# Patient Record
Sex: FEMALE | Race: WHITE | NOT HISPANIC OR LATINO | Employment: UNEMPLOYED | URBAN - NONMETROPOLITAN AREA
[De-identification: names, ages, dates, MRNs, and addresses within clinical notes are randomized per-mention and may not be internally consistent; named-entity substitution may affect disease eponyms.]

---

## 2018-08-30 ENCOUNTER — HISTORICAL (OUTPATIENT)
Dept: ADMINISTRATIVE | Facility: HOSPITAL | Age: 33
End: 2018-08-30

## 2019-10-02 ENCOUNTER — HISTORICAL (OUTPATIENT)
Dept: ADMINISTRATIVE | Facility: HOSPITAL | Age: 34
End: 2019-10-02

## 2020-06-30 ENCOUNTER — HISTORICAL (OUTPATIENT)
Dept: ADMINISTRATIVE | Facility: HOSPITAL | Age: 35
End: 2020-06-30

## 2020-09-28 ENCOUNTER — HISTORICAL (OUTPATIENT)
Dept: ADMINISTRATIVE | Facility: HOSPITAL | Age: 35
End: 2020-09-28

## 2020-11-04 ENCOUNTER — HISTORICAL (OUTPATIENT)
Dept: ADMINISTRATIVE | Facility: HOSPITAL | Age: 35
End: 2020-11-04

## 2021-05-27 LAB
CHOLEST SERPL-MCNC: 219 MG/DL (ref 0–200)
HDLC SERPL-MCNC: 47 MG/DL (ref 40–60)
LDLC SERPL CALC-MCNC: 133.2 MG/DL (ref 30–100)
TRIGL SERPL-MCNC: 154 MG/DL (ref 30–200)
TSH SERPL-ACNC: 3.04 UIU/ML (ref 0.36–3.74)

## 2021-09-03 LAB
ALBUMIN SERPL-MCNC: 4.3 G/DL (ref 3.4–5)
ALBUMIN/GLOB SERPL: 1.3 {RATIO}
ALP SERPL-CCNC: 51 U/L (ref 50–144)
ALT SERPL-CCNC: 88 U/L (ref 1–45)
ANION GAP SERPL CALC-SCNC: 8 MMOL/L (ref 7–16)
AST SERPL-CCNC: 68 U/L (ref 14–36)
BASOPHILS # BLD AUTO: 0.03 X10(3)/MCL (ref 0.01–0.08)
BASOPHILS NFR BLD AUTO: 0.5 % (ref 0.1–1.2)
BILIRUB SERPL-MCNC: 0.44 MG/DL (ref 0.1–1)
BUN SERPL-MCNC: 18 MG/DL (ref 7–20)
CALCIUM SERPL-MCNC: 10.1 MG/DL (ref 8.4–10.2)
CHLORIDE SERPL-SCNC: 102 MMOL/L (ref 94–110)
CO2 SERPL-SCNC: 28 MMOL/L (ref 21–32)
CREAT SERPL-MCNC: 1.2 MG/DL (ref 0.52–1.04)
CREAT/UREA NIT SERPL: 15 (ref 12–20)
EOSINOPHIL # BLD AUTO: 0.22 X10(3)/MCL (ref 0.04–0.36)
EOSINOPHIL NFR BLD AUTO: 3.9 % (ref 0.7–7)
ERYTHROCYTE [DISTWIDTH] IN BLOOD BY AUTOMATED COUNT: 13.7 % (ref 11–14.5)
GLOBULIN SER-MCNC: 3.3 G/DL (ref 2–3.9)
GLUCOSE SERPL-MCNC: 103 MGM./DL (ref 70–115)
HCT VFR BLD AUTO: 39.9 % (ref 36–48)
HGB BLD-MCNC: 13 G/DL (ref 11.8–16)
IMM GRANULOCYTES # BLD AUTO: 0.02 X10E3/UL (ref 0–0.03)
IMM GRANULOCYTES NFR BLD AUTO: 0.4 % (ref 0–0.5)
LYMPHOCYTES # BLD AUTO: 2.07 X10(3)/MCL (ref 1.16–3.74)
LYMPHOCYTES NFR BLD AUTO: 36.5 % (ref 20–55)
MCH RBC QN AUTO: 29.8 PG (ref 27–34)
MCHC RBC AUTO-ENTMCNC: 32.6 G/DL (ref 31–37)
MCV RBC AUTO: 91.5 FL (ref 79–99)
MONOCYTES # BLD AUTO: 0.34 X10(3)/MCL (ref 0.24–0.36)
MONOCYTES NFR BLD AUTO: 6 % (ref 4.7–12.5)
NEUTROPHILS # BLD AUTO: 2.99 X10(3)/MCL (ref 1.56–6.13)
NEUTROPHILS NFR BLD AUTO: 52.7 % (ref 37–73)
PLATELET # BLD AUTO: 280 X10(3)/MCL (ref 140–371)
PMV BLD AUTO: 12.2 FL (ref 9.4–12.4)
POTASSIUM SERPL-SCNC: 4.4 MMOL/L (ref 3.5–5.1)
PROT SERPL-MCNC: 7.6 G/DL (ref 6.3–8.2)
RBC # BLD AUTO: 4.36 X10(6)/MCL (ref 4–5.1)
SODIUM SERPL-SCNC: 138 MMOL/L (ref 135–145)
WBC # SPEC AUTO: 5.7 X10(3)/MCL (ref 4–11.5)

## 2021-09-29 LAB
ALBUMIN SERPL-MCNC: 3.7 G/DL (ref 3.4–5)
ALBUMIN/GLOB SERPL: 1.2 {RATIO}
ALP SERPL-CCNC: 41 U/L (ref 50–144)
ALT SERPL-CCNC: 69 U/L (ref 1–45)
AST SERPL-CCNC: 56 U/L (ref 14–36)
BILIRUB SERPL-MCNC: 0.35 MG/DL (ref 0.1–1)
BILIRUBIN DIRECT+TOT PNL SERPL-MCNC: 0 MG/DL (ref 0–0.3)
BILIRUBIN DIRECT+TOT PNL SERPL-MCNC: 0.14 MG/DL (ref 0–1.1)
GLOBULIN SER-MCNC: 3 G/DL (ref 2–3.9)
PROT SERPL-MCNC: 6.7 G/DL (ref 6.3–8.2)

## 2021-10-06 ENCOUNTER — HISTORICAL (OUTPATIENT)
Dept: ADMINISTRATIVE | Facility: HOSPITAL | Age: 36
End: 2021-10-06

## 2021-12-27 LAB
ALBUMIN SERPL-MCNC: 3.7 G/DL (ref 3.4–5)
ALBUMIN/GLOB SERPL: 1.4 {RATIO}
ALP SERPL-CCNC: 43 U/L (ref 50–144)
ALT SERPL-CCNC: 57 U/L (ref 1–45)
ANION GAP SERPL CALC-SCNC: 11 MMOL/L (ref 2–13)
AST SERPL-CCNC: 50 U/L (ref 14–36)
BASOPHILS # BLD AUTO: 0.03 10*3/UL (ref 0.01–0.08)
BASOPHILS NFR BLD AUTO: 0.5 % (ref 0.1–1.2)
BILIRUB SERPL-MCNC: 0.55 MG/DL (ref 0–1)
BUN SERPL-MCNC: 11 MG/DL (ref 7–20)
CALCIUM SERPL-MCNC: 9.5 MG/DL (ref 8.4–10.2)
CHLORIDE SERPL-SCNC: 107 MMOL/L (ref 94–110)
CHOLEST SERPL-MCNC: 213 MG/DL (ref 0–200)
CO2 SERPL-SCNC: 21 MMOL/L (ref 21–32)
CREAT SERPL-MCNC: 1.02 MG/DL (ref 0.52–1.04)
CREAT/UREA NIT SERPL: 10.8 (ref 12–20)
EOSINOPHIL # BLD AUTO: 0.65 10*3/UL (ref 0.04–0.36)
EOSINOPHIL NFR BLD AUTO: 10.5 % (ref 0.7–7)
ERYTHROCYTE [DISTWIDTH] IN BLOOD BY AUTOMATED COUNT: 12.1 % (ref 11–14.5)
GLOBULIN SER-MCNC: 2.7 G/DL (ref 2–3.9)
GLUCOSE SERPL-MCNC: 88 MGM./DL (ref 70–115)
HCT VFR BLD AUTO: 37.2 % (ref 36–48)
HDLC SERPL-MCNC: 42 MG/DL (ref 40–60)
HGB BLD-MCNC: 12.3 G/DL (ref 11.8–16)
IMM GRANULOCYTES # BLD AUTO: 0.01 10*3/UL (ref 0–0.03)
IMM GRANULOCYTES NFR BLD AUTO: 0.2 % (ref 0–0.5)
LDLC SERPL CALC-MCNC: 127.7 MG/DL (ref 30–100)
LYMPHOCYTES # BLD AUTO: 2.78 10*3/UL (ref 1.16–3.74)
LYMPHOCYTES NFR BLD AUTO: 44.9 % (ref 20–55)
MCH RBC QN AUTO: 31.2 PG (ref 27–34)
MCHC RBC AUTO-ENTMCNC: 33.1 G/DL (ref 31–37)
MCV RBC AUTO: 94.4 FL (ref 79–99)
MONOCYTES # BLD AUTO: 0.29 10*3/UL (ref 0.24–0.36)
MONOCYTES NFR BLD AUTO: 4.7 % (ref 4.7–12.5)
NEUTROPHILS # BLD AUTO: 2.43 10*3/UL (ref 1.56–6.13)
NEUTROPHILS NFR BLD AUTO: 39.2 % (ref 37–73)
PLATELET # BLD AUTO: 290 10*3/UL (ref 140–371)
PMV BLD AUTO: 12.4 FL (ref 9.4–12.4)
POTASSIUM SERPL-SCNC: 4.4 MMOL/L (ref 3.5–5.1)
PROT SERPL-MCNC: 6.4 G/DL (ref 6.3–8.2)
RBC # BLD AUTO: 3.94 10*6/UL (ref 4–5.1)
SODIUM SERPL-SCNC: 139 MMOL/L (ref 135–145)
TRIGL SERPL-MCNC: 187 MG/DL (ref 30–200)
WBC # SPEC AUTO: 6.2 10*3/UL (ref 4–11.5)

## 2022-01-31 LAB
AGE: 34
ALBUMIN SERPL-MCNC: 4.3 G/DL (ref 3.4–5)
ALBUMIN/GLOB SERPL: 1.3 {RATIO}
ALP SERPL-CCNC: 41 U/L (ref 50–144)
ALT SERPL-CCNC: 70 U/L (ref 1–45)
ANION GAP SERPL CALC-SCNC: 10 MMOL/L (ref 2–13)
AST SERPL-CCNC: 56 U/L (ref 14–36)
BASOPHILS # BLD AUTO: 0.04 10*3/UL (ref 0.01–0.08)
BASOPHILS NFR BLD AUTO: 0.5 % (ref 0.1–1.2)
BILIRUB SERPL-MCNC: 0.63 MG/DL (ref 0–1)
BUN SERPL-MCNC: 12 MG/DL (ref 7–20)
CALCIUM SERPL-MCNC: 10 MG/DL (ref 8.4–10.2)
CHLORIDE SERPL-SCNC: 104 MMOL/L (ref 94–110)
CO2 SERPL-SCNC: 24 MMOL/L (ref 21–32)
CREAT SERPL-MCNC: 1.06 MG/DL (ref 0.52–1.04)
CREAT/UREA NIT SERPL: 11.3 (ref 12–20)
EOSINOPHIL # BLD AUTO: 0.31 10*3/UL (ref 0.04–0.36)
EOSINOPHIL NFR BLD AUTO: 3.9 % (ref 0.7–7)
ERYTHROCYTE [DISTWIDTH] IN BLOOD BY AUTOMATED COUNT: 12.6 % (ref 11–14.5)
GLOBULIN SER-MCNC: 3.2 G/DL (ref 2–3.9)
GLUCOSE SERPL-MCNC: 100 MG/DL (ref 70–115)
HCT VFR BLD AUTO: 39.5 % (ref 36–48)
HGB BLD-MCNC: 13.2 G/DL (ref 11.8–16)
IMM GRANULOCYTES # BLD AUTO: 0.02 10*3/UL (ref 0–0.03)
IMM GRANULOCYTES NFR BLD AUTO: 0.3 % (ref 0–0.5)
LYMPHOCYTES # BLD AUTO: 2.8 10*3/UL (ref 1.16–3.74)
LYMPHOCYTES NFR BLD AUTO: 35.7 % (ref 20–55)
MANUAL DIFF? (OHS): NORMAL
MCH RBC QN AUTO: 32 PG (ref 27–34)
MCHC RBC AUTO-ENTMCNC: 33.4 G/DL (ref 31–37)
MCV RBC AUTO: 95.6 FL (ref 79–99)
MONOCYTES # BLD AUTO: 0.55 10*3/UL (ref 0.24–0.36)
MONOCYTES NFR BLD AUTO: 7 % (ref 4.7–12.5)
NEUTROPHILS # BLD AUTO: 4.13 10*3/UL (ref 1.56–6.13)
NEUTROPHILS NFR BLD AUTO: 52.6 % (ref 37–73)
PLATELET # BLD AUTO: 293 10*3/UL (ref 140–371)
PMV BLD AUTO: 12.6 FL (ref 9.4–12.4)
POTASSIUM SERPL-SCNC: 4.4 MMOL/L (ref 3.5–5.1)
PROT SERPL-MCNC: 7.5 G/DL (ref 6.3–8.2)
RBC # BLD AUTO: 4.13 10*6/UL (ref 4–5.1)
SODIUM SERPL-SCNC: 138 MMOL/L (ref 135–145)
WBC # SPEC AUTO: 7.9 10*3/UL (ref 4–11.5)

## 2022-02-03 ENCOUNTER — HISTORICAL (OUTPATIENT)
Dept: ADMINISTRATIVE | Facility: HOSPITAL | Age: 37
End: 2022-02-03

## 2022-04-10 ENCOUNTER — HISTORICAL (OUTPATIENT)
Dept: ADMINISTRATIVE | Facility: HOSPITAL | Age: 37
End: 2022-04-10

## 2022-04-26 VITALS
WEIGHT: 293 LBS | OXYGEN SATURATION: 97 % | DIASTOLIC BLOOD PRESSURE: 78 MMHG | SYSTOLIC BLOOD PRESSURE: 120 MMHG | BODY MASS INDEX: 51.91 KG/M2 | HEIGHT: 63 IN

## 2022-05-02 ENCOUNTER — HISTORICAL (OUTPATIENT)
Dept: ADMINISTRATIVE | Facility: HOSPITAL | Age: 37
End: 2022-05-02

## 2022-09-02 ENCOUNTER — HISTORICAL (OUTPATIENT)
Dept: ADMINISTRATIVE | Facility: HOSPITAL | Age: 37
End: 2022-09-02

## 2023-03-09 VITALS
WEIGHT: 293 LBS | BODY MASS INDEX: 53.92 KG/M2 | HEART RATE: 99 BPM | DIASTOLIC BLOOD PRESSURE: 82 MMHG | SYSTOLIC BLOOD PRESSURE: 122 MMHG | OXYGEN SATURATION: 96 % | HEIGHT: 62 IN | TEMPERATURE: 98 F

## 2023-03-10 RX ORDER — LEVETIRACETAM 1000 MG/1
1500 TABLET ORAL 2 TIMES DAILY
COMMUNITY

## 2023-03-10 RX ORDER — TEMAZEPAM 15 MG/1
15 CAPSULE ORAL NIGHTLY
COMMUNITY

## 2023-03-10 RX ORDER — BUSPIRONE HYDROCHLORIDE 30 MG/1
30 TABLET ORAL 2 TIMES DAILY
COMMUNITY
End: 2023-06-12 | Stop reason: SDUPTHER

## 2023-03-10 RX ORDER — LISINOPRIL 10 MG/1
10 TABLET ORAL DAILY
COMMUNITY
Start: 2022-12-20 | End: 2023-04-09 | Stop reason: SDUPTHER

## 2023-03-10 RX ORDER — POLYETHYLENE GLYCOL 3350 17 G/17G
17 POWDER, FOR SOLUTION ORAL DAILY
COMMUNITY

## 2023-03-10 RX ORDER — PANTOPRAZOLE SODIUM 40 MG/1
40 TABLET, DELAYED RELEASE ORAL DAILY
COMMUNITY
Start: 2022-09-26

## 2023-03-10 RX ORDER — PRAZOSIN HYDROCHLORIDE 5 MG/1
5 CAPSULE ORAL NIGHTLY
COMMUNITY
End: 2023-04-09 | Stop reason: SDUPTHER

## 2023-03-10 RX ORDER — METOPROLOL SUCCINATE 50 MG/1
50 TABLET, EXTENDED RELEASE ORAL DAILY
COMMUNITY
Start: 2022-12-19 | End: 2023-04-09 | Stop reason: SDUPTHER

## 2023-03-10 RX ORDER — FENOFIBRATE 145 MG/1
1 TABLET, FILM COATED ORAL DAILY
COMMUNITY
Start: 2022-12-19

## 2023-03-10 RX ORDER — CHLORHEXIDINE GLUCONATE ORAL RINSE 1.2 MG/ML
0.02 SOLUTION DENTAL 2 TIMES DAILY
COMMUNITY
Start: 2022-08-29

## 2023-04-09 ENCOUNTER — HOSPITAL ENCOUNTER (EMERGENCY)
Facility: HOSPITAL | Age: 38
Discharge: HOME | End: 2023-04-09
Attending: INTERNAL MEDICINE
Payer: MEDICAID

## 2023-04-09 VITALS
BODY MASS INDEX: 46.88 KG/M2 | SYSTOLIC BLOOD PRESSURE: 126 MMHG | WEIGHT: 258 LBS | TEMPERATURE: 99 F | HEART RATE: 89 BPM | DIASTOLIC BLOOD PRESSURE: 97 MMHG | OXYGEN SATURATION: 100 % | RESPIRATION RATE: 16 BRPM

## 2023-04-09 DIAGNOSIS — I10 UNCONTROLLED HYPERTENSION: ICD-10-CM

## 2023-04-09 DIAGNOSIS — N30.01 ACUTE CYSTITIS WITH HEMATURIA: Primary | ICD-10-CM

## 2023-04-09 DIAGNOSIS — Z91.199 NONCOMPLIANCE: ICD-10-CM

## 2023-04-09 LAB
APPEARANCE UR: CLEAR
B-HCG SERPL QL: NEGATIVE
BACTERIA #/AREA URNS AUTO: ABNORMAL /HPF
BILIRUB UR QL STRIP.AUTO: ABNORMAL MG/DL
COLOR UR AUTO: ABNORMAL
GLUCOSE UR QL STRIP.AUTO: ABNORMAL MG/DL
KETONES UR QL STRIP.AUTO: ABNORMAL MG/DL
LEUKOCYTE ESTERASE UR QL STRIP.AUTO: ABNORMAL UNIT/L
MUCOUS THREADS URNS QL MICRO: ABNORMAL /LPF
NITRITE UR QL STRIP.AUTO: POSITIVE
PH UR STRIP.AUTO: 5.5 [PH]
PROT UR QL STRIP.AUTO: ABNORMAL MG/DL
RBC #/AREA URNS AUTO: >100 /HPF
RBC UR QL AUTO: ABNORMAL UNIT/L
SP GR UR STRIP.AUTO: >=1.03
SQUAMOUS #/AREA URNS AUTO: ABNORMAL /HPF
UROBILINOGEN UR STRIP-ACNC: 1 MG/DL
WBC #/AREA URNS AUTO: ABNORMAL /HPF

## 2023-04-09 PROCEDURE — 81001 URINALYSIS AUTO W/SCOPE: CPT | Performed by: INTERNAL MEDICINE

## 2023-04-09 PROCEDURE — 81025 URINE PREGNANCY TEST: CPT | Performed by: INTERNAL MEDICINE

## 2023-04-09 PROCEDURE — 99284 EMERGENCY DEPT VISIT MOD MDM: CPT

## 2023-04-09 RX ORDER — PRAZOSIN HYDROCHLORIDE 5 MG/1
5 CAPSULE ORAL NIGHTLY
Qty: 30 CAPSULE | Refills: 1 | Status: SHIPPED | OUTPATIENT
Start: 2023-04-09 | End: 2023-05-09

## 2023-04-09 RX ORDER — METOPROLOL SUCCINATE 50 MG/1
50 TABLET, EXTENDED RELEASE ORAL DAILY
Qty: 30 TABLET | Refills: 1 | Status: SHIPPED | OUTPATIENT
Start: 2023-04-09 | End: 2023-05-19 | Stop reason: SDUPTHER

## 2023-04-09 RX ORDER — LISINOPRIL 10 MG/1
10 TABLET ORAL DAILY
Qty: 30 TABLET | Refills: 1 | Status: SHIPPED | OUTPATIENT
Start: 2023-04-09 | End: 2023-05-19 | Stop reason: SDUPTHER

## 2023-04-09 RX ORDER — SULFAMETHOXAZOLE AND TRIMETHOPRIM 800; 160 MG/1; MG/1
1 TABLET ORAL 2 TIMES DAILY
Qty: 14 TABLET | Refills: 0 | Status: SHIPPED | OUTPATIENT
Start: 2023-04-09 | End: 2023-04-16

## 2023-04-09 NOTE — ED PROVIDER NOTES
04/09/2023         11:12 AM    Source of History:  History obtained from the patient.     Chief complaint:  From Nurse Triage:  Dysuria (C/o dysuria with lower abdominal pressure. Pt states that she now has hematuria.)    HISTORY OF PRESENT ILLNES:  Chanell Hassan is a 37 y.o. 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 presenting with Dysuria (C/o dysuria with lower abdominal pressure. Pt states that she now has hematuria.)      REVIEW OF SYSTEMS:   Constitutional symptoms:  No Fever. No Chills    Skin symptoms:  No Rash.    Eye symptoms:  No Visual disturbance reported.   ENMT symptoms:  No Sore throat,    Respiratory symptoms:  No Shortness of Breath, no Cough, no Wheezing.    Cardiovascular symptoms:  No Chest Pain, No Palpitations.   Gastrointestinal symptoms:  No Abdominal Pain, No Nausea, No Vomiting, No Diarrhea, No Constipation.    Genitourinary symptoms:  Dysuria, mainly burns in the vagina only, no vaginal discharge, no bleeding   Musculoskeletal symptoms:  No Back pain,    Neurologic symptoms:  No Headache, No Dizziness.    Psychiatric symptoms:  No Anxiety, No Depression, No Substance Abuse.              Additional review of systems information: Patient Denies Any Other Complaints.    All Other Systems Reviewed With Patient And Negative.    ALLEGIES:  Review of patient's allergies indicates:   Allergen Reactions    Penicillins Swelling    Opioids - morphine analogues Hives       MEDICINE LIST:  Current Outpatient Medications   Medication Instructions    busPIRone (BUSPAR) 30 mg, Oral, 2 times daily    chlorhexidine (PERIDEX) 0.018 g, Oral, 2 times daily    fenofibrate (TRICOR) 145 MG tablet 1 tablet, Oral, Daily    levETIRAcetam (KEPPRA) 1,500 mg, Oral,  2 times daily    linaCLOtide (LINZESS) 145 mcg, Oral, Daily    lisinopriL 10 mg, Oral, Daily    metoprolol succinate (TOPROL-XL) 50 mg, Oral, Daily    pantoprazole (PROTONIX) 40 mg, Oral, Daily    polyethylene glycol (GLYCOLAX) 17 g, Oral, Daily    prazosin (MINIPRESS) 5 mg, Oral, Nightly    sulfamethoxazole-trimethoprim 800-160mg (BACTRIM DS) 800-160 mg Tab 1 tablet, Oral, 2 times daily    temazepam (RESTORIL) 15 mg, Oral, Nightly        PMH:  As per HPI and below:    Reviewed and updated in chart.    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 to alcohol     Dental caries     GERD (gastroesophageal reflux disease)     Hyperlipidemia     Hypertension     Morbid obesity     Noncompliance     Seizures     Supraventricular tachycardia         PAST SURGICAL HISTORY:  Past Surgical History:   Procedure Laterality Date    SALPINGECTOMY Right 12/31/2019       SOCIAL HISTORY:  Social History     Tobacco Use    Smoking status: Every Day     Types: Cigarettes    Smokeless tobacco: Never   Substance Use Topics    Alcohol use: Not Currently     Comment: Used to be heavy drinker, quit in 2020    Drug use: Never       FAMILY HISTORY:  Family History   Problem Relation Age of Onset    Diabetes Mother     Hypertension Mother         PROBLEM LIST:  There is no problem list on file for this patient.       PHYSICAL EXAM:      ED Triage Vitals [04/09/23 1057]   BP (!) 193/97   Pulse 107   Resp 18   Temp 98.7 °F (37.1 °C)   SpO2 99 %        Vital Signs: Reviewed As In Chart.  General:  Alert, No Cardiorespiratory Distress Noted.   Eye:  Extraocular Movements Are Intact.   ENT: Mucus membranes are moist.   Cardiovascular:  Regular Rate And Rhythm, No Murmur, No Pedal Edema.    Respiratory:  Respirations Nonlabored, No Respiratory Distress, Good Bilateral Air Entry, No Rales, No Rhonchi.    Gastrointestinal:  Soft, Non  Distended, Non Tenderness, Normal Bowel Sounds.    Neurological:  Alert And Oriented To Person, Place, Time, And Situation, Normal Motor Observed, Normal Speech Observed.  Musculoskeletal:  No Gross Deformity Noted.     Psychiatric:  Cooperative.      ED WORKUP FOR MEDICAL DECISION MAKING:    ED ORDERS:  Orders Placed This Encounter   Procedures    Urinalysis, Reflex to Urine Culture    Pregnancy, urine rapid    Urinalysis, Microscopic    Ambulatory referral/consult to [facility]       ED MEDICINES:  Medications - No data to display             ED LABS ORDERED AND REVIEWED:  Admission on 04/09/2023   Component Date Value Ref Range Status    Color, UA 04/09/2023 Brown (A)  Yellow, Light-Yellow, Dark Yellow, Justina, Straw Final    Appearance, UA 04/09/2023 Clear  Clear Final    Specific Gravity, UA 04/09/2023 >=1.030   Final    pH, UA 04/09/2023 5.5  5.0 - 8.5 Final    Protein, UA 04/09/2023 3+ (A)  Negative mg/dL Final    Glucose, UA 04/09/2023 Trace (A)  Negative, Normal mg/dL Final    Ketones, UA 04/09/2023 Trace (A)  Negative mg/dL Final    Blood, UA 04/09/2023 3+ (A)  Negative unit/L Final    Bilirubin, UA 04/09/2023 2+ (A)  Negative mg/dL Final    Urobilinogen, UA 04/09/2023 1.0  0.2, 1.0, Normal mg/dL Final    Nitrites, UA 04/09/2023 Positive (A)  Negative Final    Leukocyte Esterase, UA 04/09/2023 Trace (A)  Negative unit/L Final    Beta hCG Qualitative, Urine 04/09/2023 Negative  Negative Final    Bacteria, UA 04/09/2023 Few (A)  None Seen, Rare, Occasional /HPF Final    Mucous, UA 04/09/2023 Small (A)  None Seen /LPF Final    RBC, UA 04/09/2023 >100 (A)  None Seen, 0-2, 3-5, 0-5 /HPF Final    WBC, UA 04/09/2023 0-5  None Seen, 0-2, 3-5, 0-5 /HPF Final    Squamous Epithelial Cells, UA 04/09/2023 Few (A)  None Seen, Rare, Occasional, Occ /HPF Final       RADIOLOGY STUDIES ORDERED AND REVIEWED:  Imaging Results    None         MEDICAL DECISION MAKING:      Reviewed Nurses Note. Reviewed Vital Signs.      Reviewed Pertinent old records, History and updated as necessary.    Vitals:    04/09/23 1057   BP: (!) 193/97   Pulse: 107   Resp: 18   Temp: 98.7 °F (37.1 °C)        Medical Decision Making  37 y.o. 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 presenting with Dysuria (C/o dysuria with lower abdominal pressure. Pt states that she now has hematuria.)    Patient just have dysuria with no other symptoms at all at this time, will do a urine test, will put her on antibiotic if needed for UTI she is not taking any medicine these days, I will prescribe her blood pressure medicine again for her and let her go and see her family doctor for follow-up.    Problems Addressed:  Acute cystitis with hematuria: acute illness or injury     Details: Patient does not have pain anywhere else in the abdomen, abdominal examination is essentially negative, she does not have any flank pain, just mainly suprapubic tenderness and burning micturition.  I will put patient on antibiotic for cystitis, I have advised her to come back to the emergency room in case he develops any other symptoms.  Noncompliance:     Details: I am providing her the address for the clinic for the hospital, I am also going to provide her the list of available physicians in the area, I am putting a referral for family Medicine in [facility] also so that she can at least get hooked up with 1 of the to start getting her prescriptions and take medicines on a regular basis.  Uncontrolled hypertension: chronic illness or injury     Details: Patient is noncompliant with her medication, I will go ahead and refill her medicines, she says she has not seen her family doctor for months now they moved and they do not have a doctor in  the area.  I will provide her the list of physicians available in the area and let her go home.    Amount and/or Complexity of Data Reviewed  Labs: ordered. Decision-making details documented in ED Course.                        PROCEDURES PERFORMED IN ED:  Procedures    DIAGNOSTIC IMPRESSION:        ICD-10-CM ICD-9-CM   1. Acute cystitis with hematuria  N30.01 595.0   2. Uncontrolled hypertension  I10 401.9   3. Noncompliance  Z91.199 V15.81         ED Disposition Condition    Discharge Stable               Medication List        START taking these medications      sulfamethoxazole-trimethoprim 800-160mg 800-160 mg Tab  Commonly known as: BACTRIM DS  Take 1 tablet by mouth 2 (two) times daily. for 7 days            CONTINUE taking these medications      lisinopriL 10 MG tablet  Take 1 tablet (10 mg total) by mouth once daily.     metoprolol succinate 50 MG 24 hr tablet  Commonly known as: TOPROL-XL  Take 1 tablet (50 mg total) by mouth Daily.     prazosin 5 MG capsule  Commonly known as: MINIPRESS  Take 1 capsule (5 mg total) by mouth every evening.            ASK your doctor about these medications      busPIRone 30 MG Tab  Commonly known as: BUSPAR     chlorhexidine 0.12 % solution  Commonly known as: PERIDEX     fenofibrate 145 MG tablet  Commonly known as: TRICOR     levETIRAcetam 1000 MG tablet  Commonly known as: KEPPRA     linaCLOtide 145 mcg Cap capsule  Commonly known as: LINZESS     pantoprazole 40 MG tablet  Commonly known as: PROTONIX     polyethylene glycol 17 gram Pwpk  Commonly known as: GLYCOLAX     temazepam 15 mg Cap  Commonly known as: RESTORIL               Where to Get Your Medications        These medications were sent to [facility] - [place] - [address] [place] [address]      Phone: [phone]   lisinopriL 10 MG tablet  metoprolol succinate 50 MG 24 hr tablet  prazosin 5 MG capsule  sulfamethoxazole-trimethoprim 800-160mg 800-160 mg Tab            Follow-up Information       PMD In 2 days.               [facility] [place]. Schedule an appointment as soon as possible for a visit in 2 days.    Contact information:  [address]  [place] [address]  [phone]                            ED Prescriptions       Medication Sig Dispense Start Date End Date Auth. Provider    lisinopriL 10 MG tablet Take 1 tablet (10 mg total) by mouth once daily. 30 tablet 4/9/2023 5/9/2023 Bucky Frederick MD    metoprolol succinate (TOPROL-XL) 50 MG 24 hr tablet Take 1 tablet (50 mg total) by mouth Daily. 30 tablet 4/9/2023 5/9/2023 Bucky Frederick MD    prazosin (MINIPRESS) 5 MG capsule Take 1 capsule (5 mg total) by mouth every evening. 30 capsule 4/9/2023 5/9/2023 Bucky Frederick MD    sulfamethoxazole-trimethoprim 800-160mg (BACTRIM DS) 800-160 mg Tab Take 1 tablet by mouth 2 (two) times daily. for 7 days 14 tablet 4/9/2023 4/16/2023 Bucky Frederick MD          Follow-up Information       Follow up With Specialties Details Why Contact Info    PMD  In 2 days      [facility] [place]  Schedule an appointment as soon as possible for a visit in 2 days  [address] [place] [address]  [phone]               Bucky Frederick MD  04/09/23 5767

## 2023-04-09 NOTE — DISCHARGE INSTRUCTIONS
Take medicines as prescribed    See your family doctor in one to 2 days for further evaluation, workup, and treatment as necessary    Avoid driving or operating machinery while taking medicines as some medicines might cause drowsiness and may cause problems. Also pain medicines have potential of being addictive  so use Pain meds specially Narcotics Sparingly.    The exam and treatment you received in Emergency Room was for an urgent problem and NOT INTENDED AS COMPLETE CARE. It is important that you FOLLOW UP with a doctor for ongoing care. If your symptoms become WORSE or you DO NOT IMPROVE and you are unable to reach your health care provider, you should RETURN to the emergency department. The Emergency Room doctor has provided a PRELIMINARY INTERPRETATION of all your STUDIES. A final interpretation may be done after you are discharged. IF A CHANGE in your diagnosis or treatment is needed WE WILL CONTACT YOU. It is critical that we have a CURRENT PHONE NUMBER FOR YOU.      [facility] Primary Care Providers        [facility]  MD Inge Pugh, MARIE  [address] [place] [place] [address]  [phone] [facility]  Mia Bailey, NP  [address] [place] [address]  [phone]   [facility]  MARIE Perez MD Mark Dawson, MD Danielle Duhon, MD  [address] [place] [address]       [phone] [facility]  [address], [place]  [address]  [phone]   [facility]  [address] [place] [address]  [phone]    [facility]  Harini Dobson, MARIE  [address] [place] [address]  [phone] [facility]  Nehal Larson, MARIE  [address] [place], [place] [address] [phone]   [facility]  MD Lilian Dumont MD, MD Oc ALARCON  MD Melissa Mccloud MD  [address] [facility] [place] [address]     [phone] [facility]  MD Narda Proctor MD  [address]  [place], [place] [address] [phone]   Jt Aguayo NP  [address] [place] [address]  [phone] [facility]  MARIE Voss NP  [address] [place], [place]  [phone]   [facility]  MD Anita Vines MD Paul B. Stringfellow, MD  [address] [facility] [place] [address]  [phone] [facility]   Barney Hogan MD  [address] [place] [address]  [phone]   [facility]  [address] [place] [address]  [phone] Alvarado Kline MD  [address] [place] [address]  [phone]   [facility]  Mia Bynum NP  [address] [id number] [phone] Sarwat Saavedra NP  [address] [place] [address]  [phone]   [facility]  [address] [place]  [address]  [phone] [facility]  [address] [place] [place]  [address]  [phone]   [facility]   [address] [place] [address]  [phone] Julián Mclean MD  [address] [facility] [place]  [address] [place] [address]  [phone]   [facility]  [address] [place] [address].  [place] [address]  [phone] [facility]  [address]  [place], [place]  [address] [phone]       [facility] Pediatric Care Providers    [facility]  DO Timothy Hooper MD  [address] [place] [address] [place] [address]  [phone] Yuli Lam MD  [address] [place] [address]  [place] [address]  [phone]   [facility] Nieto, CORY  [address]    [place] [address]  [phone] Maria T CASTANEDA  MD Parvin  [address] [place] [address]  [phone]   Revised /14/2021

## 2023-05-09 ENCOUNTER — PATIENT OUTREACH (OUTPATIENT)
Dept: ADMINISTRATIVE | Facility: HOSPITAL | Age: 38
End: 2023-05-09
Payer: MEDICAID

## 2023-05-09 NOTE — PROGRESS NOTES
Population Health. Out Reach. Reviewing patient's chart for quality metrics. I contacted patient to see if she has had a recent pap smear. Patient reports she has not had a pap smear in several years.

## 2023-05-19 ENCOUNTER — HOSPITAL ENCOUNTER (OUTPATIENT)
Dept: RADIOLOGY | Facility: HOSPITAL | Age: 38
Discharge: HOME | End: 2023-05-19
Attending: REGISTERED NURSE
Payer: MEDICAID

## 2023-05-19 ENCOUNTER — OFFICE VISIT (OUTPATIENT)
Dept: FAMILY MEDICINE | Facility: CLINIC | Age: 38
End: 2023-05-19
Payer: MEDICAID

## 2023-05-19 VITALS
OXYGEN SATURATION: 98 % | HEART RATE: 93 BPM | DIASTOLIC BLOOD PRESSURE: 82 MMHG | BODY MASS INDEX: 46.19 KG/M2 | SYSTOLIC BLOOD PRESSURE: 142 MMHG | WEIGHT: 244.63 LBS | RESPIRATION RATE: 18 BRPM | TEMPERATURE: 98 F | HEIGHT: 61 IN

## 2023-05-19 DIAGNOSIS — Z32.02 PREGNANCY EXAMINATION OR TEST, NEGATIVE RESULT: ICD-10-CM

## 2023-05-19 DIAGNOSIS — R56.9 SEIZURES: ICD-10-CM

## 2023-05-19 DIAGNOSIS — E66.01 CLASS 3 SEVERE OBESITY WITH BODY MASS INDEX (BMI) OF 45.0 TO 49.9 IN ADULT, UNSPECIFIED OBESITY TYPE, UNSPECIFIED WHETHER SERIOUS COMORBIDITY PRESENT: ICD-10-CM

## 2023-05-19 DIAGNOSIS — Z00.00 WELLNESS EXAMINATION: ICD-10-CM

## 2023-05-19 DIAGNOSIS — Z76.89 ESTABLISHING CARE WITH NEW DOCTOR, ENCOUNTER FOR: ICD-10-CM

## 2023-05-19 DIAGNOSIS — F31.9 BIPOLAR 1 DISORDER: Primary | ICD-10-CM

## 2023-05-19 DIAGNOSIS — M54.50 RIGHT LOW BACK PAIN, UNSPECIFIED CHRONICITY, UNSPECIFIED WHETHER SCIATICA PRESENT: ICD-10-CM

## 2023-05-19 DIAGNOSIS — K59.09 CHRONIC CONSTIPATION: ICD-10-CM

## 2023-05-19 DIAGNOSIS — I10 HYPERTENSION, UNSPECIFIED TYPE: ICD-10-CM

## 2023-05-19 DIAGNOSIS — E78.5 HYPERLIPIDEMIA, UNSPECIFIED HYPERLIPIDEMIA TYPE: ICD-10-CM

## 2023-05-19 DIAGNOSIS — I10 UNCONTROLLED HYPERTENSION: ICD-10-CM

## 2023-05-19 PROBLEM — E66.9 OBESITY (BMI 30-39.9): Status: ACTIVE | Noted: 2023-05-19

## 2023-05-19 LAB
B-HCG UR QL: NEGATIVE
CTP QC/QA: YES

## 2023-05-19 PROCEDURE — 3079F PR MOST RECENT DIASTOLIC BLOOD PRESSURE 80-89 MM HG: ICD-10-PCS | Mod: CPTII,,, | Performed by: REGISTERED NURSE

## 2023-05-19 PROCEDURE — 99204 OFFICE O/P NEW MOD 45 MIN: CPT | Mod: S$PBB,,, | Performed by: REGISTERED NURSE

## 2023-05-19 PROCEDURE — 4010F PR ACE/ARB THEARPY RXD/TAKEN: ICD-10-PCS | Mod: CPTII,,, | Performed by: REGISTERED NURSE

## 2023-05-19 PROCEDURE — 72110 X-RAY EXAM L-2 SPINE 4/>VWS: CPT | Mod: TC

## 2023-05-19 PROCEDURE — 3008F PR BODY MASS INDEX (BMI) DOCUMENTED: ICD-10-PCS | Mod: CPTII,,, | Performed by: REGISTERED NURSE

## 2023-05-19 PROCEDURE — 3077F PR MOST RECENT SYSTOLIC BLOOD PRESSURE >= 140 MM HG: ICD-10-PCS | Mod: CPTII,,, | Performed by: REGISTERED NURSE

## 2023-05-19 PROCEDURE — 99999 PR PBB SHADOW E&M-EST. PATIENT-LVL V: CPT | Mod: PBBFAC,,, | Performed by: REGISTERED NURSE

## 2023-05-19 PROCEDURE — 99999 PR PBB SHADOW E&M-EST. PATIENT-LVL V: ICD-10-PCS | Mod: PBBFAC,,, | Performed by: REGISTERED NURSE

## 2023-05-19 PROCEDURE — 3077F SYST BP >= 140 MM HG: CPT | Mod: CPTII,,, | Performed by: REGISTERED NURSE

## 2023-05-19 PROCEDURE — 1159F PR MEDICATION LIST DOCUMENTED IN MEDICAL RECORD: ICD-10-PCS | Mod: CPTII,,, | Performed by: REGISTERED NURSE

## 2023-05-19 PROCEDURE — 1159F MED LIST DOCD IN RCRD: CPT | Mod: CPTII,,, | Performed by: REGISTERED NURSE

## 2023-05-19 PROCEDURE — 4010F ACE/ARB THERAPY RXD/TAKEN: CPT | Mod: CPTII,,, | Performed by: REGISTERED NURSE

## 2023-05-19 PROCEDURE — 81025 URINE PREGNANCY TEST: CPT | Performed by: REGISTERED NURSE

## 2023-05-19 PROCEDURE — 99215 OFFICE O/P EST HI 40 MIN: CPT | Mod: PBBFAC | Performed by: REGISTERED NURSE

## 2023-05-19 PROCEDURE — 99204 PR OFFICE/OUTPT VISIT, NEW, LEVL IV, 45-59 MIN: ICD-10-PCS | Mod: S$PBB,,, | Performed by: REGISTERED NURSE

## 2023-05-19 PROCEDURE — 3008F BODY MASS INDEX DOCD: CPT | Mod: CPTII,,, | Performed by: REGISTERED NURSE

## 2023-05-19 PROCEDURE — 3079F DIAST BP 80-89 MM HG: CPT | Mod: CPTII,,, | Performed by: REGISTERED NURSE

## 2023-05-19 RX ORDER — LISINOPRIL 10 MG/1
10 TABLET ORAL DAILY
Qty: 90 TABLET | Refills: 0 | Status: SHIPPED | OUTPATIENT
Start: 2023-05-19 | End: 2023-08-17

## 2023-05-19 RX ORDER — METOPROLOL SUCCINATE 50 MG/1
50 TABLET, EXTENDED RELEASE ORAL DAILY
Qty: 90 TABLET | Refills: 0 | Status: SHIPPED | OUTPATIENT
Start: 2023-05-19 | End: 2023-08-17

## 2023-05-19 NOTE — PROGRESS NOTES
Subjective     Patient ID: Chanell Hassan is a 37 y.o. female.    Chief Complaint: Establish Care (Patient presents to clinic to establish care//), Depression (Patient has mental health appointment with MHNP 5/22/23 at 2:30 pm for medication management/Recently lost her father and sister within the past month/), and Medication Refill (Pt needs medication refills )    Patient is a 37 y.o. female here today to establish care. Pt  has a past medical history of Anxiety, Bipolar disorder, Chronic idiopathic constipation, alcohol and drug abuse, Dental caries, GERD (gastroesophageal reflux disease), Hyperlipidemia, Hypertension, Morbid obesity, Noncompliance, Seizures, and Supraventricular tachycardia.  Patient complaining of lower right-sided back pain >1 month, 5/10 on pain scale, patient states pain extends to right leg and knee.  Patient denied recent injury or trauma.  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  Patient stated she stopped taking all of her psych medications, denies SI/HI at this time.  Review of Systems   Constitutional:  Negative for chills, fatigue and fever.   HENT:  Positive for dental problem.    Respiratory:  Negative for cough and shortness of breath.    Cardiovascular:  Negative for chest pain, palpitations and leg swelling.   Musculoskeletal:  Positive for back pain.   Psychiatric/Behavioral:  Positive for dysphoric mood. Negative for self-injury and suicidal ideas.         Objective     Physical Exam  Vitals and nursing note reviewed.   Constitutional:       Appearance: Normal appearance. She is obese.   HENT:      Head: Normocephalic and atraumatic.      Nose: Nose normal.      Mouth/Throat:      Mouth: Mucous membranes are moist.      Dentition: Dental caries present.   Eyes:      Pupils: Pupils are equal, round, and reactive to light.   Cardiovascular:      Rate and Rhythm: Normal rate and  regular rhythm.      Pulses: Normal pulses.      Heart sounds: Normal heart sounds.   Pulmonary:      Effort: Pulmonary effort is normal.      Breath sounds: Normal breath sounds.   Abdominal:      General: Abdomen is flat. Bowel sounds are normal.      Palpations: Abdomen is soft.   Musculoskeletal:         General: Tenderness present.      Cervical back: Normal range of motion and neck supple.      Lumbar back: Tenderness present. No spasms. Negative right straight leg raise test and negative left straight leg raise test.   Skin:     General: Skin is warm.      Capillary Refill: Capillary refill takes less than 2 seconds.   Neurological:      General: No focal deficit present.      Mental Status: She is alert and oriented to person, place, and time.      Gait: Gait normal.   Psychiatric:         Mood and Affect: Mood is depressed. Affect is flat.         Speech: Speech normal.         Behavior: Behavior normal. Behavior is cooperative.         Thought Content: Thought content normal. Thought content does not include homicidal or suicidal ideation. Thought content does not include homicidal or suicidal plan.         Cognition and Memory: Cognition and memory normal.         Judgment: Judgment normal.          Assessment and Plan     Problem List Items Addressed This Visit          Neuro    Seizures       Psychiatric    Bipolar 1 disorder - Primary       Cardiac/Vascular    Uncontrolled hypertension    Relevant Medications    metoprolol succinate (TOPROL-XL) 50 MG 24 hr tablet    Hyperlipidemia    Relevant Orders    Lipid Panel    Hemoglobin A1C       Endocrine    Obesity (BMI 30-39.9)       GI    Chronic constipation       Other    Establishing care with new doctor, encounter for     Other Visit Diagnoses       Pregnancy examination or test, negative result        Relevant Orders    POCT Urine Pregnancy (Completed)    Hypertension, unspecified type        Relevant Medications    lisinopriL 10 MG tablet    Other  Relevant Orders    TSH    Right low back pain, unspecified chronicity, unspecified whether sciatica present        Relevant Orders    X-Ray Lumbar Spine 5 View    Wellness examination        Relevant Orders    CBC Auto Differential    Vitamin D    Hepatitis C Antibody    HIV 1/2 Ag/Ab (4th Gen)        I spent a total of 45 minutes on the day of the visit.This includes face to face time and non-face to face time preparing to see the patient (eg, review of tests), obtaining and/or reviewing separately obtained history, documenting clinical information in the electronic or other health record, independently interpreting results and communicating results to the patient/family/caregiver, or care coordinator.    Hypertension-blood pressure elevated in clinic today, lisinopril and metoprolol refilled and sent to pharmacy on file today.  Patient given blood pressure log and instructed to monitor and record blood pressures for the next week, return to clinic with readings.  Low-sodium diet and weight loss discussed.    Seizures-patient has been seizure-free > 1 year, continue current medication regimen    Bipolar-patient instructed to take medications as prescribed, keep next mental health appointment (Monday), report to ED with any SI/HI    Back pain-x-ray ordered, will call patient for results, patient instructed to take OTC Tylenol or Motrin as directed    Chronic constipation-increase fiber in diet, increase water intake    Hyperlipidemia-low-fat/low-cholesterol diet discussed, continue current medication regimen.    Obesity-Body mass index is 46.22 kg/m². Morbid obesity complicates all aspects of disease management from diagnostic modalities to treatment. Weight loss encouraged and health benefits explained to patient.      Return to clinic in 2 weeks for wellness and BP recheck, labs to be completed prior to visit

## 2023-05-21 ENCOUNTER — HOSPITAL ENCOUNTER (EMERGENCY)
Facility: HOSPITAL | Age: 38
Discharge: HOME | End: 2023-05-21
Attending: FAMILY MEDICINE
Payer: MEDICAID

## 2023-05-21 VITALS
WEIGHT: 250 LBS | HEART RATE: 129 BPM | HEIGHT: 63 IN | OXYGEN SATURATION: 98 % | DIASTOLIC BLOOD PRESSURE: 108 MMHG | RESPIRATION RATE: 20 BRPM | TEMPERATURE: 98 F | BODY MASS INDEX: 44.3 KG/M2 | SYSTOLIC BLOOD PRESSURE: 169 MMHG

## 2023-05-21 DIAGNOSIS — K08.89 PAIN, DENTAL: Primary | ICD-10-CM

## 2023-05-21 DIAGNOSIS — K02.9 DENTAL CARIES: ICD-10-CM

## 2023-05-21 PROCEDURE — 96372 THER/PROPH/DIAG INJ SC/IM: CPT | Performed by: FAMILY MEDICINE

## 2023-05-21 PROCEDURE — 25000003 PHARM REV CODE 250: Performed by: FAMILY MEDICINE

## 2023-05-21 PROCEDURE — 63600175 PHARM REV CODE 636 W HCPCS: Performed by: FAMILY MEDICINE

## 2023-05-21 PROCEDURE — 99284 EMERGENCY DEPT VISIT MOD MDM: CPT

## 2023-05-21 RX ORDER — IBUPROFEN 800 MG/1
800 TABLET ORAL EVERY 8 HOURS PRN
Qty: 30 TABLET | Refills: 0 | Status: SHIPPED | OUTPATIENT
Start: 2023-05-21 | End: 2023-05-31

## 2023-05-21 RX ORDER — CLINDAMYCIN HYDROCHLORIDE 300 MG/1
300 CAPSULE ORAL EVERY 6 HOURS
Qty: 40 CAPSULE | Refills: 0 | Status: SHIPPED | OUTPATIENT
Start: 2023-05-21 | End: 2023-05-31

## 2023-05-21 RX ORDER — KETOROLAC TROMETHAMINE 30 MG/ML
60 INJECTION, SOLUTION INTRAMUSCULAR; INTRAVENOUS
Status: COMPLETED | OUTPATIENT
Start: 2023-05-21 | End: 2023-05-21

## 2023-05-21 RX ORDER — CHLORHEXIDINE GLUCONATE ORAL RINSE 1.2 MG/ML
15 SOLUTION DENTAL 2 TIMES DAILY
Qty: 300 ML | Refills: 0 | Status: SHIPPED | OUTPATIENT
Start: 2023-05-21 | End: 2023-05-31

## 2023-05-21 RX ORDER — CLINDAMYCIN HYDROCHLORIDE 150 MG/1
300 CAPSULE ORAL
Status: COMPLETED | OUTPATIENT
Start: 2023-05-21 | End: 2023-05-21

## 2023-05-21 RX ORDER — HYDROCODONE BITARTRATE AND ACETAMINOPHEN 10; 325 MG/1; MG/1
1 TABLET ORAL
Status: COMPLETED | OUTPATIENT
Start: 2023-05-21 | End: 2023-05-21

## 2023-05-21 RX ORDER — HYDROCODONE BITARTRATE AND ACETAMINOPHEN 5; 325 MG/1; MG/1
1 TABLET ORAL EVERY 6 HOURS PRN
Qty: 12 TABLET | Refills: 0 | Status: SHIPPED | OUTPATIENT
Start: 2023-05-21 | End: 2023-05-24

## 2023-05-21 RX ADMIN — KETOROLAC TROMETHAMINE 60 MG: 30 INJECTION, SOLUTION INTRAMUSCULAR; INTRAVENOUS at 11:05

## 2023-05-21 RX ADMIN — CLINDAMYCIN HYDROCHLORIDE 300 MG: 150 CAPSULE ORAL at 11:05

## 2023-05-21 RX ADMIN — HYDROCODONE BITARTRATE AND ACETAMINOPHEN 1 TABLET: 10; 325 TABLET ORAL at 11:05
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
## 2023-06-07 ENCOUNTER — OFFICE VISIT (OUTPATIENT)
Dept: FAMILY MEDICINE | Facility: CLINIC | Age: 38
End: 2023-06-07
Payer: MEDICAID

## 2023-06-07 VITALS
HEART RATE: 89 BPM | OXYGEN SATURATION: 99 % | HEIGHT: 63 IN | BODY MASS INDEX: 44.3 KG/M2 | DIASTOLIC BLOOD PRESSURE: 89 MMHG | RESPIRATION RATE: 18 BRPM | TEMPERATURE: 98 F | WEIGHT: 250 LBS | SYSTOLIC BLOOD PRESSURE: 139 MMHG

## 2023-06-07 DIAGNOSIS — Z00.00 WELLNESS EXAMINATION: ICD-10-CM

## 2023-06-07 DIAGNOSIS — N91.2 AMENORRHEA: ICD-10-CM

## 2023-06-07 DIAGNOSIS — F31.9 BIPOLAR 1 DISORDER: ICD-10-CM

## 2023-06-07 DIAGNOSIS — E66.01 CLASS 3 SEVERE OBESITY WITH BODY MASS INDEX (BMI) OF 45.0 TO 49.9 IN ADULT, UNSPECIFIED OBESITY TYPE, UNSPECIFIED WHETHER SERIOUS COMORBIDITY PRESENT: ICD-10-CM

## 2023-06-07 DIAGNOSIS — E78.5 HYPERLIPIDEMIA, UNSPECIFIED HYPERLIPIDEMIA TYPE: ICD-10-CM

## 2023-06-07 DIAGNOSIS — Z12.4 CERVICAL CANCER SCREENING: Primary | ICD-10-CM

## 2023-06-07 DIAGNOSIS — K21.9 GASTROESOPHAGEAL REFLUX DISEASE, UNSPECIFIED WHETHER ESOPHAGITIS PRESENT: ICD-10-CM

## 2023-06-07 DIAGNOSIS — K59.09 CHRONIC CONSTIPATION: ICD-10-CM

## 2023-06-07 DIAGNOSIS — I10 HYPERTENSION, UNSPECIFIED TYPE: ICD-10-CM

## 2023-06-07 PROCEDURE — 3075F SYST BP GE 130 - 139MM HG: CPT | Mod: CPTII,,, | Performed by: REGISTERED NURSE

## 2023-06-07 PROCEDURE — 3008F BODY MASS INDEX DOCD: CPT | Mod: CPTII,,, | Performed by: REGISTERED NURSE

## 2023-06-07 PROCEDURE — 3079F DIAST BP 80-89 MM HG: CPT | Mod: CPTII,,, | Performed by: REGISTERED NURSE

## 2023-06-07 PROCEDURE — 3079F PR MOST RECENT DIASTOLIC BLOOD PRESSURE 80-89 MM HG: ICD-10-PCS | Mod: CPTII,,, | Performed by: REGISTERED NURSE

## 2023-06-07 PROCEDURE — 99214 OFFICE O/P EST MOD 30 MIN: CPT | Mod: PBBFAC | Performed by: REGISTERED NURSE

## 2023-06-07 PROCEDURE — 1159F PR MEDICATION LIST DOCUMENTED IN MEDICAL RECORD: ICD-10-PCS | Mod: CPTII,,, | Performed by: REGISTERED NURSE

## 2023-06-07 PROCEDURE — 99999 PR PBB SHADOW E&M-EST. PATIENT-LVL IV: ICD-10-PCS | Mod: PBBFAC,,, | Performed by: REGISTERED NURSE

## 2023-06-07 PROCEDURE — 99215 OFFICE O/P EST HI 40 MIN: CPT | Mod: S$PBB,,, | Performed by: REGISTERED NURSE

## 2023-06-07 PROCEDURE — 99215 PR OFFICE/OUTPT VISIT, EST, LEVL V, 40-54 MIN: ICD-10-PCS | Mod: S$PBB,,, | Performed by: REGISTERED NURSE

## 2023-06-07 PROCEDURE — 3008F PR BODY MASS INDEX (BMI) DOCUMENTED: ICD-10-PCS | Mod: CPTII,,, | Performed by: REGISTERED NURSE

## 2023-06-07 PROCEDURE — 4010F ACE/ARB THERAPY RXD/TAKEN: CPT | Mod: CPTII,,, | Performed by: REGISTERED NURSE

## 2023-06-07 PROCEDURE — 3075F PR MOST RECENT SYSTOLIC BLOOD PRESS GE 130-139MM HG: ICD-10-PCS | Mod: CPTII,,, | Performed by: REGISTERED NURSE

## 2023-06-07 PROCEDURE — 99999 PR PBB SHADOW E&M-EST. PATIENT-LVL IV: CPT | Mod: PBBFAC,,, | Performed by: REGISTERED NURSE

## 2023-06-07 PROCEDURE — 4010F PR ACE/ARB THEARPY RXD/TAKEN: ICD-10-PCS | Mod: CPTII,,, | Performed by: REGISTERED NURSE

## 2023-06-07 PROCEDURE — 1159F MED LIST DOCD IN RCRD: CPT | Mod: CPTII,,, | Performed by: REGISTERED NURSE

## 2023-06-07 NOTE — PROGRESS NOTES
Subjective     Patient ID: Chanell Hassan is a 37 y.o. female.    Chief Complaint: wellness (Pt presents to clinic for wellness with labs )    37-year-old established female presents to clinic today for wellness, Pap exam and lab review.  Patient has history of,Anxiety, Bipolar disorder, Chronic idiopathic constipation, alcohol and drug abuse, Dental caries, GERD (gastroesophageal reflux disease), Hyperlipidemia, Hypertension, Morbid obesity, Noncompliance, Seizures, and Supraventricular tachycardia.  Patient was seen in ED on May 21st for dental pain and discharged home with clindamycin and Lortab.  No complaints at this time.  Review of Systems   Constitutional: Negative.  Negative for chills, fatigue and fever.   HENT:  Positive for dental problem.    Genitourinary:  Positive for menstrual irregularity. Negative for pelvic pain, vaginal bleeding, vaginal discharge and vaginal pain.   Integumentary:  Negative for breast mass, breast discharge and breast tenderness.   Psychiatric/Behavioral:  Negative for self-injury and suicidal ideas.    All other systems reviewed and are negative.  Breast: Negative for mass and tenderness       Objective     Physical Exam       Assessment and Plan   1. Cervical cancer screening  - Liquid-Based Pap Smear, Screening completed today, will call patient with results    2. Wellness examination  -healthy lifestyle and weight loss discussed with patient, labs reviewed today.  Patient instructed to increase physical activity to 30 minutes most days as tolerated    3. Bipolar 1 disorder  -continue previously prescribed medication, keep next scheduled appointment with mental health provider, report to ED with any auditory/visual hallucinations, SI/HI.    4. Class 3 severe obesity with body mass index (BMI) of 45.0 to 49.9 in adult, unspecified obesity type, unspecified whether serious comorbidity present  -Body mass index is 44.29 kg/m². Morbid obesity complicates all aspects of disease  management from diagnostic modalities to treatment. Weight loss encouraged and health benefits explained to patient.      5. Hyperlipidemia, unspecified hyperlipidemia type  -patient instructed to increase physical activity, adhere to low-fat/low-cholesterol diet, lipid panel to be redrawn in three-month    6. Hypertension, unspecified type  -blood pressure controlled on current medication regimen, continue low-sodium diet discussed with patient    7. Chronic constipation  -continue current medication regimen, increase fiber in diet, increase water intake    8. Gastroesophageal reflux disease, unspecified whether esophagitis present  -The pathophysiology of reflux is discussed.  Anti-reflux measures such as raising the head of the bed, avoiding tight clothing or belts, avoiding eating late at night and not lying down shortly after mealtime and achieving weight loss are discussed. Avoid ASA, NSAID's, caffeine, peppermints, alcohol and tobacco. OTC H2 blockers and/or antacids are often very helpful for PRN use. For persisting chronic or daily symptoms, prescription strength H2 blockers or PPI's may be used. She should alert me if there are persistent symptoms, dysphagia, weight loss or GI bleeding.       Return to clinic in 3 months for follow-up, lipid panel to be drawn prior to visit

## 2023-06-12 DIAGNOSIS — F31.9 BIPOLAR 1 DISORDER: Primary | ICD-10-CM

## 2023-06-12 RX ORDER — BUSPIRONE HYDROCHLORIDE 30 MG/1
30 TABLET ORAL 2 TIMES DAILY
Qty: 60 TABLET | Refills: 1 | Status: SHIPPED | OUTPATIENT
Start: 2023-06-12 | End: 2023-08-11

## 2023-06-16 LAB — PSYCHE PATHOLOGY RESULT: NORMAL

## 2023-06-21 ENCOUNTER — TELEPHONE (OUTPATIENT)
Dept: FAMILY MEDICINE | Facility: CLINIC | Age: 38
End: 2023-06-21
Payer: MEDICAID

## 2023-06-21 NOTE — PROGRESS NOTES
Please inform patient of pap results, +Trich, I can send her medications to pharmacy on file, her partner needs to be tested as well, thanks. Let me know if she wants to come in or if she wants me to just send meds...

## 2023-06-21 NOTE — TELEPHONE ENCOUNTER
----- Message from NERISSA Rubio sent at 6/21/2023  8:17 AM CDT -----  Please inform patient of pap results, +Trich, I can send her medications to pharmacy on file, her partner needs to be tested as well, thanks. Let me know if she wants to come in or if she wants me to just send meds...

## 2023-06-21 NOTE — TELEPHONE ENCOUNTER
Informed pt of results. Pt wishes to present to clinic to discuss results. Pt appt 6/28/23 scheduled by LEVI. Unable to present to clinic earlier due to transportation issues.

## 2023-09-11 PROBLEM — Z00.00 WELLNESS EXAMINATION: Status: RESOLVED | Noted: 2023-06-07 | Resolved: 2023-09-11
